# Patient Record
Sex: FEMALE | Race: WHITE
[De-identification: names, ages, dates, MRNs, and addresses within clinical notes are randomized per-mention and may not be internally consistent; named-entity substitution may affect disease eponyms.]

---

## 2017-05-13 ENCOUNTER — HOSPITAL ENCOUNTER (EMERGENCY)
Dept: HOSPITAL 50 - VM.ED | Age: 72
Discharge: HOME | End: 2017-05-13
Payer: MEDICARE

## 2017-05-13 VITALS — DIASTOLIC BLOOD PRESSURE: 78 MMHG | SYSTOLIC BLOOD PRESSURE: 136 MMHG

## 2017-05-13 DIAGNOSIS — M54.9: ICD-10-CM

## 2017-05-13 DIAGNOSIS — Z88.8: ICD-10-CM

## 2017-05-13 DIAGNOSIS — Z79.82: ICD-10-CM

## 2017-05-13 DIAGNOSIS — Z79.899: ICD-10-CM

## 2017-05-13 DIAGNOSIS — R06.1: Primary | ICD-10-CM

## 2017-05-13 PROCEDURE — 71020: CPT

## 2017-05-13 PROCEDURE — 94640 AIRWAY INHALATION TREATMENT: CPT

## 2017-05-13 PROCEDURE — 96375 TX/PRO/DX INJ NEW DRUG ADDON: CPT

## 2017-05-13 PROCEDURE — S0028 INJECTION, FAMOTIDINE, 20 MG: HCPCS

## 2017-05-13 PROCEDURE — 96374 THER/PROPH/DIAG INJ IV PUSH: CPT

## 2017-05-13 PROCEDURE — 99284 EMERGENCY DEPT VISIT MOD MDM: CPT

## 2017-05-13 PROCEDURE — 99285 EMERGENCY DEPT VISIT HI MDM: CPT

## 2017-05-13 NOTE — EDM.PDOC
ED HPI GENERAL MEDICAL PROBLEM





- General


Chief Complaint: Respiratory Problem


Stated Complaint: Shortness of Breath


Time Seen by Provider: 05/13/17 16:54


Source of Information: Reports: Patient, Family, RN, RN Notes Reviewed


History Limitations: Reports: No Limitations





- History of Present Illness


INITIAL COMMENTS - FREE TEXT/NARRATIVE: 


Patient presents to the emergency room at [facility] complaining of upper 

airway tightness, shortness of breath, wheezing. The patient states that she 

had a cortisone injection 3 days ago for back pain. The patient states that her 

breathing since the cortisone injection has progressively gotten worse. The 

patient states today has been the worst, when the airway tightness started. 

According to the , he states the patient seems to gasp for air. The 

patient has had a cortisone injection last December for her low back pain. The 

patient states she has had an on/off cough that just started with the shortness 

of breath.


Onset: Today


Duration: Constant





- Related Data


 Allergies











Allergy/AdvReac Type Severity Reaction Status Date / Time


 


aspirin [From Aggrenox] Allergy  Nausea Verified 05/09/17 12:43


 


dipyridamole [From Aggrenox] Allergy  Nausea Verified 05/09/17 12:43











Home Meds: 


 Home Meds





Albuterol [Proventil HFA] 2 puff PO Q4H PRN 12/19/16 [History]


Aspirin 1 tab PO DAILY 12/19/16 [History]


Budesonide/Formoterol Fumarate [Symbicort 160-4.5 Mcg Inhaler] 2 puff PO DAILY 

12/19/16 [History]


Ibuprofen [Advil] 200 - 600 mg PO QID PRN 12/19/16 [History]


Lactose-Reduced Food [Ensure] 1 dose PO DAILY 12/19/16 [History]


Metoprolol Succinate [Toprol XL] 50 mg PO DAILY 12/19/16 [History]


Pregabalin [Lyrica] 25 mg PO BID 12/19/16 [History]


Propylene Glycol/Peg 400 [Systane 0.3-0.4% Eye Drops] 1 drop EYEBOTH ASDIRECTED 

12/19/16 [History]


atorvaSTATin [Lipitor] 20 mg PO DAILY 12/19/16 [History]


traMADol [Ultram] 25 mg PO Q4H PRN 12/19/16 [History]











ED ROS GENERAL





- Review of Systems


Review Of Systems: See Below


Constitutional: Denies: Fever, Chills, Weakness


HEENT: Reports: No Symptoms


Respiratory: Reports: Shortness of Breath, Cough


Cardiovascular: Reports: Dyspnea on Exertion.  Denies: Chest Pain, Palpitations


Skin: Reports: No Symptoms


Neurological: Reports: No Symptoms.  Denies: Headache, Numbness, Paresthesia, 

Tingling





ED EXAM, GENERAL





- Physical Exam


Exam: See Below


Exam Limited By: No Limitations


General Appearance: Alert, No Apparent Distress, Thin, Cachetic


Throat/Mouth: Normal Inspection, Normal Oropharynx, No Airway Compromise


Respiratory/Chest: Decreased Breath Sounds, Stridor, Retractions


Cardiovascular: Regular Rate, Rhythm


Neurological: Alert, Oriented


Skin Exam: Warm, Dry, Intact, Normal Color, No Rash





Course





- Orders/Labs/Meds


Orders: 


 Active Orders 24 hr











 Category Date Time Status


 


 RT Aerosol Therapy [RC] ASDIRECTED Care  05/13/17 17:01 Active


 


 Chest 2V [CR] Stat Exams  05/13/17 17:10 Ordered


 


 Sodium Chloride 0.9% [Saline Flush] Med  05/13/17 16:58 Active





 10 ml FLUSH ASDIRECTED PRN   


 


 Peripheral IV Insertion Adult [OM.PC] Routine Oth  05/13/17 16:58 Ordered








 Medication Orders





Sodium Chloride (Saline Flush)  10 ml FLUSH ASDIRECTED PRN


   PRN Reason: Keep Vein Open








Meds: 


Medications











Generic Name Dose Route Start Last Admin





  Trade Name Freq  PRN Reason Stop Dose Admin


 


Sodium Chloride  10 ml  05/13/17 16:58  





  Saline Flush  FLUSH   





  ASDIRECTED PRN   





  Keep Vein Open   














Discontinued Medications














Generic Name Dose Route Start Last Admin





  Trade Name Freq  PRN Reason Stop Dose Admin


 


Albuterol/Ipratropium  3 ml  05/13/17 17:01  05/13/17 17:10





  Duoneb 3.0-0.5 Mg/3 Ml  NEB  05/13/17 17:02  3 ml





  ONETIME ONE   Administration


 


Famotidine  20 mg  05/13/17 16:58  05/13/17 17:07





  Pepcid  IVPUSH  05/13/17 16:59  20 mg





  ONETIME ONE   Administration


 


Methylprednisolone Sodium Succinate  125 mg  05/13/17 16:58  05/13/17 17:09





  Solu-Medrol  IVPUSH  05/13/17 16:59  125 mg





  ONETIME ONE   Administration














- Re-Assessments/Exams


Free Text/Narrative Re-Assessment/Exam: 





05/13/17 18:05


Discussed breathing with patient. She states it feels back to her normal 

pattern. She no longer feels SOB. Discussed xray findings. All questions 

answered. 





Departure





- Departure


Time of Disposition: 18:06


Disposition: Home, Self-Care 01


Condition: good


Clinical Impression: 


 Chronic stridor








- Discharge Information


Instructions:  Shortness of Breath, Easy-to-Read


Referrals: 


Deborah Wisdom,  [Primary Care Provider] - 


Forms:  ED Department Discharge


Additional Instructions: 


1. Stay well hydrated and rest


2. Use inhalers at home if your feels your shortness of breath getting worse


3. Continue all other medications the same


4. Eat some food, this will help


5. See your Primary as symptoms warrant





- Problem List Review


Problem List Initiated/Reviewed/Updated: Yes





- My Orders


Last 24 Hours: 


My Active Orders





05/13/17 16:58


Sodium Chloride 0.9% [Saline Flush]   10 ml FLUSH ASDIRECTED PRN 


Peripheral IV Insertion Adult [OM.PC] Routine 





05/13/17 17:01


RT Aerosol Therapy [RC] ASDIRECTED 





05/13/17 17:10


Chest 2V [CR] Stat 














- Assessment/Plan


Last 24 Hours: 


My Active Orders





05/13/17 16:58


Sodium Chloride 0.9% [Saline Flush]   10 ml FLUSH ASDIRECTED PRN 


Peripheral IV Insertion Adult [OM.PC] Routine 





05/13/17 17:01


RT Aerosol Therapy [RC] ASDIRECTED 





05/13/17 17:10


Chest 2V [CR] Stat

## 2018-01-05 ENCOUNTER — HOSPITAL ENCOUNTER (INPATIENT)
Dept: HOSPITAL 50 - VM.MS | Age: 73
LOS: 2 days | DRG: 193 | End: 2018-01-07
Attending: FAMILY MEDICINE | Admitting: FAMILY MEDICINE
Payer: MEDICARE

## 2018-01-05 ENCOUNTER — HOSPITAL ENCOUNTER (INPATIENT)
Dept: HOSPITAL 50 - VM.MS | Age: 73
LOS: 1 days | Discharge: ANOTHER HOSPITAL | DRG: 947 | End: 2018-01-06
Attending: FAMILY MEDICINE | Admitting: FAMILY MEDICINE
Payer: MEDICARE

## 2018-01-05 DIAGNOSIS — E43: ICD-10-CM

## 2018-01-05 DIAGNOSIS — I27.20: ICD-10-CM

## 2018-01-05 DIAGNOSIS — Z51.5: ICD-10-CM

## 2018-01-05 DIAGNOSIS — M80.08XD: ICD-10-CM

## 2018-01-05 DIAGNOSIS — E78.5: ICD-10-CM

## 2018-01-05 DIAGNOSIS — Z93.1: ICD-10-CM

## 2018-01-05 DIAGNOSIS — G89.29: ICD-10-CM

## 2018-01-05 DIAGNOSIS — I21.A1: ICD-10-CM

## 2018-01-05 DIAGNOSIS — J38.02: ICD-10-CM

## 2018-01-05 DIAGNOSIS — J96.11: ICD-10-CM

## 2018-01-05 DIAGNOSIS — I73.9: ICD-10-CM

## 2018-01-05 DIAGNOSIS — J18.9: Primary | ICD-10-CM

## 2018-01-05 DIAGNOSIS — R53.1: Primary | ICD-10-CM

## 2018-01-05 DIAGNOSIS — M48.50XA: ICD-10-CM

## 2018-01-05 DIAGNOSIS — I48.92: ICD-10-CM

## 2018-01-05 DIAGNOSIS — I95.9: ICD-10-CM

## 2018-01-05 DIAGNOSIS — Q21.3: ICD-10-CM

## 2018-01-05 DIAGNOSIS — I10: ICD-10-CM

## 2018-01-05 DIAGNOSIS — M81.0: ICD-10-CM

## 2018-01-05 LAB
CHLORIDE SERPL-SCNC: 99 MMOL/L (ref 98–107)
SODIUM SERPL-SCNC: 143 MMOL/L (ref 136–145)

## 2018-01-05 NOTE — PCM.SN
- Free Text/Narrative


Note: 





Called by nursing that patient's BP is back down to 60s/30s; she remains alert 

and answering questions appropriately. Will give another 100 cc bolus over an 

hour. Low blood pressures presumed to be secondary to less intake today with 

the transition to [place]. Will hold off on further evaluation as long as 

she continues to respond to these gentle boluses. If she does not, then will do 

CBC, troponin, BMP, lactate, and CRP for further evaluation.

## 2018-01-05 NOTE — PCM.SN
- Free Text/Narrative


Note: 





Although patient's blood pressure continues to respond nicely to fluid boluses, 

it also continues to drop again once boluses are stopped. She remains alert and 

answering questions appropriately per nursing report. Will increase fluid rate 

to 100 cc/hr again. Will also check stat labs. Check BP again in 30 minutes.

## 2018-01-05 NOTE — HP
CHIEF COMPLAINT/HISTORY OF PRESENT ILLNESS:  Weakness and deconditioning

following a prolonged acute stay at [facility] from  through 

for aspiration pneumonia, treated with antibiotics, completed her course.

 

Atrial flutter.  Asymmetric septal hypertrophy with a dynamic left ventricular

outflow obstruction.  EF 80%.  Chronic bilateral vocal cord paralysis and weight

loss really gradually over the past 2 years, but her weight was only 2 pounds

greater 6 months ago when she declined PEG tube placements.

 

Non-ST-elevation MI due to demand ischemia.  No significant coronary artery

workup took place.

 

The patient had previous chronic pain, had multiple vertebral compression

fractures.  She was taking tramadol 1 to 2 times a day for pain.  She was found

to be unresponsive or less responsive.  She did respond to Narcan, therefore,

was taken off her tramadol in [place].  She had also been on Lyrica.  She had

received a benzodiazepine in the emergency room in [place].  She had also

received some Lovenox due to concern for MI.  She was found to be in SVT and did

receive Cardizem and Lasix.  She did receive some Lasix last night.  She states

she went to the bathroom quite a bit.  She states her breathing is the same.

She is not having trouble with cough.  She denies any pain currently.  She was

switched to oral metoprolol.  She did have some rapid heart rates during her

stay, but they have been more controlled recently.  Otherwise, she did have some

GI bleeding after her PEG tube placement on , which the melena was on

.  GI was consulted, but did not do any scopes.  Due to her condition, they

felt it might be high risk and the bleeding eventually stopped.  She was then

placed on a PPI and interestingly was given NSAIDs for pain.  She had an MRI of

the brain, which was negative.  The cause of her vocal cord paralysis is unknown

and really she has had this since the .  Last year, she was seen by ENT who

recommended tracheostomy, but the patient declined.  She also declined

tracheostomy at this time as her father had one for cancer, but she did express

interest along with her family of having it if she gains more strength back.

She even had a palliative care consult during her admission, but she felt with

her family that she was not ready for palliative care and wanted to continue

with aggressive treatments.  Otherwise, when she came to swing bed, she was a

little bit tired, but she was alert.  She was able to answer questions

appropriately.  Her breathing and wheezing with throat noise were similar to

what I have experienced when I have been around her in the past.  She was on 2 L

of oxygen and has been on this amount down in [place].  Her weight was 36.2 kg

when she arrived.  She had no leg swelling.

 

PAST MEDICAL HISTORY:  Includes previous hysterectomy for pelvic prolapse with

bilateral salpingo-oophorectomy and anterior colporrhaphy in 2013.

Chronic dysphonia, probably due to the vocal cord paralysis, and really she

actually worked with speech therapy back in the .  Diastolic dysfunction on

echo in .  Hyperlipidemia, mild pulmonary hypertension, peripheral vascular

disease with a right CEA, but never smoked, but did have a previous TIA.

Glaucoma.  Reactive airway disease that is not asthma, worse in the humidity in

[place].  Osteoporosis with previous fracture and chronic thoracic back pain.

Essential hypertension, stopped Toprol in 2017.

 

PAST SURGICAL HISTORY:  Surgically as listed above.  Otherwise, the patient has

had tubal ligation, cataract surgery, breast biopsies.

 

SOCIAL HISTORY:  Socially, the patient is .  She is a retired professor

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  She lives at home with her .  They

have 2 children.  She is nonsmoker, nondrinker.

 

FAMILY HISTORY:  Her mother is , had a heart attack, had osteoporosis

and kyphosis later in life.  Father had alcohol abuse and laryngeal cancer.

Sister is alive and healthy.

 

REVIEW OF SYSTEMS:

General:  She has had some weight loss up and down, it has been a chronic thing.

I do not think it is fair to say there has been an excessive weight loss, but

she is down 8 pounds since October.

HEENT:  No sore throat, but she has had some secretions.  She has had no fever

or chills.

Cardiac:  No chest pain.  No palpitations.

Respiratory:  She has had a little bit of a cough, no worse.   states she

was always coughing in the mornings at home.  Her breathing is the same.  There

has been some wheezing, but no changes.

Abdomen:  No nausea, vomiting, or abdominal pain but did have some loose stools

presumably due to the tube feeds.  She was reported to have some C. diff testing

in [place] that has been negative, although I did not find that done since 

at which point it was negative.

Neurologic:  She has had no new weakness.  No focal weakness.  No numbness.

Musculoskeletal:  Back pain has not been any worse.  She, otherwise, has no new

aches or pains.

Otherwise, all systems reviewed and found to be negative unless, otherwise,

stated.

 

PHYSICAL EXAMINATION:

Vital Signs:  Include weight 36.2 kg, temp 97, pulse 93, blood pressure 100/70,

respiratory rate 30, O2 95% on 2 L.

General:  She is in no acute distress.  She appears quite tired.  She is resting

comfortably in bed.

Heart:  Regular rate and rhythm with murmur noted.

Lungs:  Lung sounds are slightly decreased over the right base, but clear

without crackles.  There is no peripheral wheezing.  Her wheezing appears more

central or in the trachea area.

Mental Status:  She is alert and orientated x3.  She answers questions, but her

voice is soft.  It does take considerable effort for her to talk.

Abdomen:  Has a G-tube in place.  Positive bowel sounds.  Soft and nontender.

Extremities:  Warm and dry.  No edema.  Overall, she is mildly pale.

Mental Status:  She is alert.  She is orientated x3.  She recognizes me.

 

LABORATORY DATA:  Laboratory work from yesterday did show a troponin still at

0.069, was trending down.  Her glucoses have been quite high up to 232 and 208

today.  Otherwise, her kidney function yesterday, creatinine was 0.6, BUN 25.

Potassium 4.1, sodium 146, phosphorus 1.7, albumin 2.6, calcium 8.  Last blood

count was white count 11.6 on , hemoglobin 12.5, platelets 275.

 

ASSESSMENT:

1. Recent aspiration pneumonia, completed antibiotics.  This is due to vocal

    cord paralysis.  The patient is at significant risk for this to reoccur.

    This was discussed extensively with her and her family.  She would like to

    get stronger and consider tracheostomy placement.

2. Bilateral vocal cord paralysis.  The patient has had for over 25 years.

    She has seen specialists for this in the past.  Brain MRI was okay.

3. Atrial flutter with previous rapid ventricular response.  This is new

    onset.  She is now having a more controlled response.  We will continue

    metoprolol but hold for low blood pressures.

4. Dynamic left ventricular outflow obstruction.  The patient at this point

    actually appears slightly dry.  I am going to change her Lasix to p.r.n.

    and give it to her if needed for fluid retention.  We will get support

    stockings in place.

5. Non-ST-elevation myocardial infarction in the setting of demand ischemia.

    She is on aspirin.  No signs of gastrointestinal bleeding.

6. Chronic pain related to compression fractures in the back.  She will be on

    Tylenol.  I am going to hold NSAIDs.  We will also continue her Lyrica.

7. Chronic hypoxic respiratory failure since her admission for pneumonia.  We

    will continue oxygen and wean off as able.

8. Previous peripheral vascular disease.

9. Severe malnutrition.  She is on tube feeds.  She is currently at 50 mL/h

    with q.i.d. 100 mL of fluid.  We will follow up with a sodium on Monday.

10.Deep vein thrombosis prophylaxis.  I will put her on support stockings.

    She can be up and working with PT.

 

PLAN:  PT, OT, and Speech consults.  Labs on Monday.  Close monitoring of blood

pressure.  Atropine ordered for secretions.  Lasix to p.r.n.  Discussed plans of

care is to try to get stronger.  I instructed that overall prognosis is very

guarded.  Family would like to try everything, so if her condition worsens,

recommend labs, x-rays, acute treatments, and transfer back to [place] as

indicated.  I updated Dr. Hunter who will be covering for the weekend.

 

 

MKA:  2018 17:36:49  MODL:  2018 22:20:40

Job #:  [id number]

## 2018-01-05 NOTE — PCM.SN
- Free Text/Narrative


Note: 





Called by nursing staff when patient BP 60/30's. She had gotten a bolus through 

her G-tube and they were working on getting the IV placed. I came in to assess 

the patient who was awake and answering questions appropriately. While the IV 

was being placed, another 100 cc bolus given through her g-tube. ER nurse able 

to get IV in the foot. LR then started at 100 cc/hr in order not to cause 

issues with the IV. Instructed nursing staff to call with BP in 30 minutes.





On recheck, BP much better at 87/40's. Will complete bolus of 100 cc only and 

recheck BP 30 minutes after this is stopped.

## 2018-01-06 VITALS — SYSTOLIC BLOOD PRESSURE: 130 MMHG | DIASTOLIC BLOOD PRESSURE: 68 MMHG

## 2018-01-06 RX ADMIN — MORPHINE SULFATE PRN MG: 100 SOLUTION ORAL at 21:07

## 2018-01-06 RX ADMIN — MORPHINE SULFATE PRN MG: 100 SOLUTION ORAL at 18:00

## 2018-01-06 RX ADMIN — MORPHINE SULFATE PRN MG: 100 SOLUTION ORAL at 18:35

## 2018-01-06 RX ADMIN — MORPHINE SULFATE PRN MG: 100 SOLUTION ORAL at 22:37

## 2018-01-06 NOTE — PCM.SN
- Free Text/Narrative


Note: 





Had restarted fluids at 50 cc/hr. Despite this, patient's BP dropped again to 

the 60's/30's. I came in to reassess the patient. She is lightheaded but 

otherwise answering questions appropriately. She denies any fever, chills, cough

, abdominal pain, vomiting, or diarrhea. No UTI symptoms.





Fluids increased back to 100 cc/hr. Stat labs also obtained. BP did improve 

with higher fluid rates. WBC quite high; CRP up slightly; lactic acid is 

normal. CXR obtained and shows evidence of bibasilar pneumonia vs. atelectasis. 

Based on her WBC, favor pneumonia.





Family came in to see the patient. Held a discussion about transfer to [place] in 

light of limited options for fluid resuscitation if we lose current IV access 

or BP does not continue to respond to slow fluid infusion rates. Patient does 

not want to transfer to [place]. Then held a conversation about doing antibiotics 

and continuing IV fluids and she desires to do so. Last, again reviewed code 

status and she would like to remain full code. Family present and in agreement 

with this plan.





Will start ceftriaxone and metronidazole as she responded well to this in 

[place]. Will have to give ceftriaxone IM due to interaction with LR in same 

line. Patient aware and accepting of this. Flagyl will be given PO. Hopefully 

tomorrow she can transition to orals. 





BP has come up nicely with the fluids. Will decrease fluid rate to 50 cc/hr and 

continue that overnight. Routine vital signs. 





Apparently there was some mix-up and she was placed on acute inpatient status 

earlier today. This is appropriate given her current status and we will leave 

her as inpatient for now.





Patient and family aware that her condition could change and that we will have 

ongoing discussions on level of intervention desired as the days go on.

## 2018-01-06 NOTE — PCM.PN
- General Info


Date of Service: 01/06/18


Subjective Update: 





Patient seen this morning. Does not answer questions this morning. Family 

present at bedside. Patient has had some difficulty with shortness of breath 

this am. See summary of this morning's events below under a/p.





- Patient Data


Vitals - Most Recent: 


 Last Vital Signs











Temp  37.2 C   01/06/18 07:49


 


Pulse  95   01/06/18 07:49


 


Resp  34 H  01/06/18 07:49


 


BP  120/52 L  01/06/18 07:49


 


Pulse Ox  96   01/06/18 07:49











Weight - Most Recent: 36.287 kg


I&O - Last 24 Hours: 


 Intake & Output











 01/05/18 01/06/18 01/06/18





 22:59 06:59 14:59


 


Intake Total 200 100 


 


Output Total 120  


 


Balance 80 100 











Lab Results Last 24 Hours: 


 Laboratory Results - last 24 hr











  01/05/18 01/05/18 01/05/18 Range/Units





  23:20 23:20 23:20 


 


WBC  18.3 H    (4.0-10.0)  x10^3/uL


 


RBC  3.65 L    (4.00-5.50)  x10^6/uL


 


Hgb  11.7 L D    (12.0-16.0)  g/dL


 


Hct  36.9    (33.0-47.0)  %


 


MCV  101.1 H D    (78.0-93.0)  fL


 


MCH  32.1 H    (26.0-32.0)  pg


 


MCHC  31.7 L    (32.0-36.0)  g/dL


 


RDW Coeff of Yoshi  13.3    (10.0-15.0)  %


 


Plt Count  287    (130-400)  x10^3/uL


 


Add Manual Diff  Yes    


 


Neutrophils % (Manual)  85 H    (50-80)  %


 


Band Neutrophils %  7 H    (0-6)  %


 


Lymphocytes % (Manual)  2 L    (25-50)  %


 


Atypical Lymphs %  5 H    (0)  %


 


Monocytes % (Manual)  1 L    (2-11)  %


 


Platelet Estimate  Adequate    


 


Polychromasia  1+ slight H    


 


Poikilocytosis  1+ slight H    


 


Anisocytosis  1+ slight H    


 


Sodium   143   (136-145)  mmol/L


 


Potassium   3.4 L   (3.5-5.1)  mmol/L


 


Chloride   99   ()  mmol/L


 


Carbon Dioxide   40 H D   (21-32)  mmol/L


 


BUN   39 H   (7-18)  mg/dL


 


Creatinine   0.9   (0.55-1.02)  mg/dL


 


Est Cr Clr Drug Dosing   31.89   mL/min


 


Estimated GFR (MDRD)   > 60   


 


Glucose   198 H   ()  mg/dL


 


POC Glucose     ()  mg/dL


 


Lactic Acid    1.8  (0.4-2.0)  mmol/L


 


Calcium   7.9 L   (8.5-10.1)  mg/dL


 


Troponin I   0.087 H*   (<=0.056)  ng/mL


 


C-Reactive Protein   3.4 H   (<=0.9)  mg/dL














  01/06/18 Range/Units





  06:19 


 


WBC   (4.0-10.0)  x10^3/uL


 


RBC   (4.00-5.50)  x10^6/uL


 


Hgb   (12.0-16.0)  g/dL


 


Hct   (33.0-47.0)  %


 


MCV   (78.0-93.0)  fL


 


MCH   (26.0-32.0)  pg


 


MCHC   (32.0-36.0)  g/dL


 


RDW Coeff of Yoshi   (10.0-15.0)  %


 


Plt Count   (130-400)  x10^3/uL


 


Add Manual Diff   


 


Neutrophils % (Manual)   (50-80)  %


 


Band Neutrophils %   (0-6)  %


 


Lymphocytes % (Manual)   (25-50)  %


 


Atypical Lymphs %   (0)  %


 


Monocytes % (Manual)   (2-11)  %


 


Platelet Estimate   


 


Polychromasia   


 


Poikilocytosis   


 


Anisocytosis   


 


Sodium   (136-145)  mmol/L


 


Potassium   (3.5-5.1)  mmol/L


 


Chloride   ()  mmol/L


 


Carbon Dioxide   (21-32)  mmol/L


 


BUN   (7-18)  mg/dL


 


Creatinine   (0.55-1.02)  mg/dL


 


Est Cr Clr Drug Dosing   mL/min


 


Estimated GFR (MDRD)   


 


Glucose   ()  mg/dL


 


POC Glucose  205 H  ()  mg/dL


 


Lactic Acid   (0.4-2.0)  mmol/L


 


Calcium   (8.5-10.1)  mg/dL


 


Troponin I   (<=0.056)  ng/mL


 


C-Reactive Protein   (<=0.9)  mg/dL











Marcus Results Last 24 Hours: 


 Microbiology











 01/05/18 13:05 MRSA Surveillance Culture - Final





 Nares, Left    NO MRSA ISOLATED











Med Orders - Current: 


 Current Medications





Acetaminophen (Tylenol)  650 mg PO Q6H PRN


   PRN Reason: Pain


   Last Admin: 01/06/18 06:35 Dose:  650 mg


Albuterol (Proventil Neb Soln)  2.5 mg NEB Q4HRRT PRN


   PRN Reason: Wheezing


Artificial Tears (Tears Naturale Free)  1 each EYEBOTH ASDIRECTED PRN


   PRN Reason: DRY EYES


Atropine Sulfate (Atropine 1% Ophth Soln)  0 ml SL Q2H PRN


   PRN Reason: Other


Calcitonin [place] (Miacalcin Nasal Spray)  0 ml ARPAN DAILY [facility]


Metoprolol Tartrate (Lopressor)  50 mg GTUBE TID [facility]


   Last Admin: 01/06/18 07:52 Dose:  Not Given


Morphine Sulfate (Morphine 20 Mg/Ml Soln)  5 mg GTUBE Q30M PRN


   PRN Reason: Shortness of breath/dyspnea


Budesonide/Formoterol (Own Supply)  0 puff PO DAILY [facility]


Pregabalin (Lyrica)  50 mg GTUBE BID [facility]


   Last Admin: 01/05/18 20:59 Dose:  50 mg


Senna/Docusate Sodium (Senna Plus)  1 tab GTUBE DAILY PRN


   PRN Reason: Constipation


Sodium Chloride (Saline Flush)  10 ml FLUSH ASDIRECTED PRN


   PRN Reason: Keep Vein Open





Discontinued Medications





Aspirin (Aspirin)  81 mg GTUBE DAILY [facility]


Atorvastatin Calcium (Lipitor)  20 mg GTUBE DAILY [facility]


Calcium Carbonate/Glycine (Calcium Carbonate 250 Mg/Ml Susp)  1,250 mg GTUBE 

DAILY [facility]


Ceftriaxone Sodium (Rocephin)  1 gm IM ONETIME ONE


   Stop: 01/06/18 01:17


   Last Admin: 01/06/18 02:21 Dose:  1 gm


Furosemide (Lasix)  20 mg GTUBE DAILY [facility]


Furosemide (Lasix)  20 mg GTUBE DAILY PRN


   PRN Reason: Edema


Furosemide (Lasix)  5 mg IV ONETIME ONE


   Stop: 01/06/18 09:09


Lactated Ringer's (Ringers, Lactated)  1,000 mls @ 100 mls/hr IV ASDIRECTED [facility]


   Stop: 01/05/18 19:30


Lactated Ringer's (Ringers, Lactated)  1,000 mls @ 100 mls/hr IV ASDIRECTED [facility]


   Last Infusion: 01/06/18 01:23 Dose:  50 mls/hr


Metronidazole (Flagyl)  500 mg GTUBE Q8H [facility]


   Last Admin: 01/06/18 02:22 Dose:  500 mg











- Exam


General: Moderate Distress, Lethargic


Lungs: Crackles


Cardiovascular: Regular Rate, Regular Rhythm, No Murmurs


GI/Abdominal Exam: Normal Bowel Sounds, Soft, Non-Tender, No Distention


Extremities: Normal Inspection, No Pedal Edema


Skin: Warm, Dry, Intact





- Problem List & Annotations


(1) Palliative care patient


SNOMED Code(s): 978924982


   Code(s): Z51.5 - ENCOUNTER FOR PALLIATIVE CARE   Status: Acute   Current 

Visit: Yes   Annotation/Comment:: Patient seen many times last night for 

hypotension. BP's have improved with IV fluids. However, this am, she is now 

having more difficulty breathing. Unclear whether this is fluid overload or 

presumed aspiration pneumonia given patient inability to participate with lung 

exam. She is much more sleepy this am and unable to answer questions 

appropriately. Her , son, daughter, and sister are present at the 

bedside. Again reviewed her declining condition. She is in respiratory 

distress. Her saturations are staying up with nasal cannula but he respiratory 

status otherwise is distressed. Reviewed that if we do not intervene for this, 

she will continue to decline and likely pass away in the next few hours. 

Discussed that possible interventions would include IV diuretics and antibiotics

, non-invasive positive pressure ventilation, and intubation. Discussed also 

that we could again consider transition to a more comfort care approach and 

change her management to that regard. After this discussion, her  and 

family have opted to transition to comfort cares. They are ok with her getting 

IV lasix to see if this helps at all with her respiratory status. Other than 

that, no further interventions. Her medications will be simplified. Morphine 

will be added PRN pain or shortness of breath. Will stop vital sign checks and 

allow her family to spend time with her. All questions answered. My condolences 

were offered.   





- Problem List Review


Problem List Initiated/Reviewed/Updated: Yes





- My Orders


Last 24 Hours: 


My Active Orders





01/05/18 18:25


Communication Order [RC] STAT 





01/05/18 18:48


Communication Order [RC] PER UNIT ROUTINE 





01/06/18 00:06


Chest 1V Frontal [CR] Stat 





01/06/18 01:07


Admission Status [Patient Status] [ADT] Routine 





01/06/18 09:22


Resuscitation Status Routine 





01/06/18 09:25


Morphine [Morphine 20 MG/ML Soln]   5 mg GTUBE Q30M PRN 





01/06/18 09:26


Oxygen Therapy [RC] PRN 














- Assessment


Assessment:: 





See above.





- Plan


Plan:: 





See above.

## 2018-01-06 NOTE — PCM.SN
- Free Text/Narrative


Note: 





Tube feed bottle ended. Nursing wondering if they should continue. Spoke with 

family and recommend at least a temporary hold on the tube feeds given concern 

for aspiration at this time. No other changes. Patient is comfortable.

## 2018-01-07 RX ADMIN — MORPHINE SULFATE PRN MG: 100 SOLUTION ORAL at 03:14

## 2018-01-17 NOTE — PCM.DCSUM1
**Discharge Summary





- Hospital Course


Brief History: Patient transitioned to acute cares due to need for increased 

evaluation and management of shortness of breath.





- Discharge Data


Discharge Date: 18


Discharge Disposition:  20


Condition: 





- Discharge Diagnosis/Problem(s)


(1) Palliative care patient


SNOMED Code(s): 464350271


   ICD Code: Z51.5 - ENCOUNTER FOR PALLIATIVE CARE   Status: Acute   Problem 

Details: Patient seen many times last night for hypotension. BP's have improved 

with IV fluids. However, this am, she is now having more difficulty breathing. 

Unclear whether this is fluid overload or presumed aspiration pneumonia given 

patient inability to participate with lung exam. She is much more sleepy this 

am and unable to answer questions appropriately. Her , son, daughter, 

and sister are present at the bedside. Again reviewed her declining condition. 

She is in respiratory distress. Her saturations are staying up with nasal 

cannula but he respiratory status otherwise is distressed. Reviewed that if we 

do not intervene for this, she will continue to decline and likely pass away in 

the next few hours. Discussed that possible interventions would include IV 

diuretics and antibiotics, non-invasive positive pressure ventilation, and 

intubation. Discussed also that we could again consider transition to a more 

comfort care approach and change her management to that regard. After this 

discussion, her  and family have opted to transition to comfort cares. 

They are ok with her getting IV lasix to see if this helps at all with her 

respiratory status. Other than that, no further interventions. Her medications 

will be simplified. Morphine will be added PRN pain or shortness of breath. 

Will stop vital sign checks and allow her family to spend time with her. All 

questions answered. My condolences were offered.   





- Patient Summary/Data


Consults: 


 Consultations





18 11:33


PT Evaluation and Treatment [CONS] Routine 





18 11:34


OT Evaluation and Treatment [CONS] Routine 





18 11:37


Consult to Speech Language Pathology [SLP Evaluation and Treatment] [CONS] 

Routine 











Hospital Course: 





Patient's clinical status progressively worsened and she was transitioned to 

comfort cares. Her symptoms were controlled and she passed comfortably in the 

presence of her family.





- Discharge Plan


Home Medications: 


 Home Meds





Albuterol [Proventil HFA] 2 puff PO Q4H PRN 16 [History]


Aspirin 81 mg GTUBE DAILY 16 [History]


Budesonide/Formoterol Fumarate [Symbicort 160-4.5 Mcg Inhaler] 2 puff PO DAILY 

16 [History]


Ibuprofen [Advil] 200 - 600 mg GTUBE QID PRN 16 [History]


Propylene Glycol/Peg 400 [Systane 0.3-0.4% Eye Drops] 1 drop EYEBOTH ASDIRECTED 

16 [History]


atorvaSTATin [Lipitor] 20 mg GTUBE DAILY 16 [History]


Calcitonin ([place]) [Miacalcin Nasal Spray] 1 spray ARPAN DAILY [facility] alternate 

nostril 17 [History]


Calcium Carb & Citrate/Vit D3 [Calcium + D3 ER Tablet] 500 mg GTUBE DAILY  [History]


Sennosides/Docusate Sodium [Senokot-S Tablet] 1 tab GTUBE DAILY PRN 17 [

History]


Furosemide 20 mg GTUBE DAILY 18 [History]


Metoprolol Tartrate 50 mg GTUBE TID 18 [History]


Pregabalin [Lyrica] 50 mg GTUBE BID 18 [History]











- Patient Data


Vitals - Most Recent: 


 Last Vital Signs











Temp  36.7 C   18 10:00


 


Pulse  105 H  18 21:07


 


Resp  24 H  18 10:00


 


BP  130/68   18 21:07


 


Pulse Ox  95   18 20:00











Weight - Most Recent: 36.287 kg


Med Orders - Current: 


 Current Medications








Discontinued Medications





Acetaminophen (Tylenol)  650 mg PO Q6H PRN


   PRN Reason: Pain


   Last Admin: 18 06:35 Dose:  650 mg


Albuterol (Proventil Neb Soln)  2.5 mg NEB Q4HRRT PRN


   PRN Reason: Wheezing


   Last Admin: 18 07:40 Dose:  2.5 mg


Artificial Tears (Tears Naturale Free)  1 each EYEBOTH ASDIRECTED PRN


   PRN Reason: DRY EYES


Aspirin (Aspirin)  81 mg GTUBE DAILY [facility]


   Last Admin: 18 11:09 Dose:  Not Given


Atorvastatin Calcium (Lipitor)  20 mg GTUBE DAILY [facility]


   Last Admin: 18 11:09 Dose:  Not Given


Atropine Sulfate (Atropine 1% Ophth Soln)  0 ml SL Q2H PRN


   PRN Reason: Other


Calcitonin [place] (Miacalcin Nasal Spray)  0 ml ARPAN DAILY [facility]


   Last Admin: 18 11:09 Dose:  Not Given


Calcium Carbonate/Glycine (Calcium Carbonate 250 Mg/Ml Susp)  1,250 mg GTUBE 

DAILY [facility]


   Last Admin: 18 11:09 Dose:  Not Given


Ceftriaxone Sodium (Rocephin)  1 gm IM ONETIME ONE


   Stop: 18 01:17


   Last Admin: 18 02:21 Dose:  1 gm


Furosemide (Lasix)  20 mg GTUBE DAILY [facility]


Furosemide (Lasix)  20 mg GTUBE DAILY PRN


   PRN Reason: Edema


Furosemide (Lasix)  5 mg IV ONETIME ONE


   Stop: 18 09:09


   Last Admin: 18 10:03 Dose:  5 mg


Lactated Ringer's (Ringers, Lactated)  1,000 mls @ 100 mls/hr IV ASDIRECTED [facility]


   Stop: 18 19:30


Lactated Ringer's (Ringers, Lactated)  1,000 mls @ 100 mls/hr IV ASDIRECTED [facility]


   Last Infusion: 18 01:23 Dose:  50 mls/hr


Lorazepam (Ativan)  0.5 mg GTUBE Q1H PRN


   PRN Reason: Restlessness or Anxiety


   Last Admin: 18 22:36 Dose:  0.5 mg


Metoprolol Tartrate (Lopressor)  50 mg GTUBE TID [facility]


   Last Admin: 18 21:07 Dose:  50 mg


Metronidazole (Flagyl)  500 mg GTUBE Q8H [facility]


   Last Admin: 18 02:22 Dose:  500 mg


Morphine Sulfate (Morphine 20 Mg/Ml Soln)  5 mg GTUBE Q30M PRN


   PRN Reason: Shortness of breath/dyspnea


   Last Admin: 18 03:14 Dose:  5 mg


Budesonide/Formoterol (Own Supply)  0 puff PO DAILY [facility]


   Last Admin: 18 13:32 Dose:  Not Given


Pregabalin (Lyrica)  50 mg GTUBE BID [facility]


   Last Admin: 18 21:07 Dose:  50 mg


Senna/Docusate Sodium (Senna Plus)  1 tab GTUBE DAILY PRN


   PRN Reason: Constipation


Sodium Chloride (Saline Flush)  10 ml FLUSH ASDIRECTED PRN


   PRN Reason: Keep Vein Open











*Q Meaningful Use (DIS)





- VTE *Q


VTE Criteria *Q: 








- Stroke *Q


Stroke Criteria *Q: 








- AMI *Q


AMI Criteria *Q:

## 2018-01-17 NOTE — PCM.DCSUM1
**Discharge Summary





- Hospital Course


Brief History: Patient admitted on swing bed following a hospital stay in [place]

, transitioned to acute due to increased shortness of breath.





- Discharge Data


Discharge Date: 18


Discharge Disposition:  20


Condition: Good





- Patient Summary/Data


Hospital Course: 





Patient's clinical status progressively worsened and based on conversations 

with she and her family was eventually transitioned to comfort cares. She 

passed comfortably in the presence of her family.





- Discharge Plan


Home Medications: 


 Home Meds





Albuterol [Proventil HFA] 2 puff PO Q4H PRN 16 [History]


Aspirin 81 mg GTUBE DAILY 16 [History]


Budesonide/Formoterol Fumarate [Symbicort 160-4.5 Mcg Inhaler] 2 puff PO DAILY 

16 [History]


Ibuprofen [Advil] 200 - 600 mg GTUBE QID PRN 16 [History]


Propylene Glycol/Peg 400 [Systane 0.3-0.4% Eye Drops] 1 drop EYEBOTH ASDIRECTED 

16 [History]


atorvaSTATin [Lipitor] 20 mg GTUBE DAILY 16 [History]


Calcitonin ([place]) [Miacalcin Nasal Spray] 1 spray ARPAN DAILY [facility] alternate 

nostril 17 [History]


Calcium Carb & Citrate/Vit D3 [Calcium + D3 ER Tablet] 500 mg GTUBE DAILY  [History]


Sennosides/Docusate Sodium [Senokot-S Tablet] 1 tab GTUBE DAILY PRN 17 [

History]


Furosemide 20 mg GTUBE DAILY 18 [History]


Metoprolol Tartrate 50 mg GTUBE TID 18 [History]


Pregabalin [Lyrica] 50 mg GTUBE BID 18 [History]











- Discharge Summary/Plan Comment


DC Time >30 min.: No





- Patient Data


Med Orders - Current: 


 Current Medications





Lactated Ringer's (Ringers, Lactated)  1,000 mls @ 100 mls/hr IV ASDIRECTED [facility]





Discontinued Medications





Lactated Ringer's (Ringers, Lactated)  1,000 mls @ 100 mls/hr IV ASDIRECTED [facility]


   Stop: 18 23:59


Pregabalin (Lyrica)  50 mg GTUBE BID [facility]


   Stop: 18 23:59











*Q Meaningful Use (DIS)





- VTE *Q


VTE Criteria *Q: 








- Stroke *Q


Stroke Criteria *Q: 








- AMI *Q


AMI Criteria *Q: